# Patient Record
Sex: MALE | Race: WHITE | NOT HISPANIC OR LATINO | Employment: FULL TIME | URBAN - METROPOLITAN AREA
[De-identification: names, ages, dates, MRNs, and addresses within clinical notes are randomized per-mention and may not be internally consistent; named-entity substitution may affect disease eponyms.]

---

## 2023-07-11 ENCOUNTER — OFFICE VISIT (OUTPATIENT)
Dept: PRIMARY CARE | Facility: CLINIC | Age: 33
End: 2023-07-11

## 2023-07-11 VITALS
DIASTOLIC BLOOD PRESSURE: 58 MMHG | SYSTOLIC BLOOD PRESSURE: 102 MMHG | HEIGHT: 74 IN | RESPIRATION RATE: 16 BRPM | WEIGHT: 165 LBS | OXYGEN SATURATION: 100 % | BODY MASS INDEX: 21.17 KG/M2 | HEART RATE: 98 BPM

## 2023-07-11 DIAGNOSIS — R06.02 SOB (SHORTNESS OF BREATH): Primary | ICD-10-CM

## 2023-07-11 DIAGNOSIS — R06.02 SOB (SHORTNESS OF BREATH): ICD-10-CM

## 2023-07-11 PROBLEM — R79.89 ELEVATED SERUM CREATININE: Status: ACTIVE | Noted: 2023-07-11

## 2023-07-11 PROBLEM — R79.89 LOW VITAMIN D LEVEL: Status: ACTIVE | Noted: 2023-07-11

## 2023-07-11 PROBLEM — M54.2 TRIGGER POINT WITH NECK PAIN: Status: ACTIVE | Noted: 2023-07-11

## 2023-07-11 PROBLEM — S43.439A LABRAL TEAR OF SHOULDER: Status: ACTIVE | Noted: 2023-07-11

## 2023-07-11 PROBLEM — M54.42 ACUTE BILATERAL LOW BACK PAIN WITH LEFT-SIDED SCIATICA: Status: ACTIVE | Noted: 2023-07-11

## 2023-07-11 PROBLEM — R42 DIZZINESS: Status: ACTIVE | Noted: 2023-07-11

## 2023-07-11 PROBLEM — R51.9 HEADACHE: Status: ACTIVE | Noted: 2023-07-11

## 2023-07-11 PROBLEM — R47.01 EXPRESSIVE APHASIA: Status: ACTIVE | Noted: 2023-07-11

## 2023-07-11 PROBLEM — R07.0 THROAT PAIN: Status: ACTIVE | Noted: 2023-07-11

## 2023-07-11 PROBLEM — R22.1 NECK SWELLING: Status: ACTIVE | Noted: 2023-07-11

## 2023-07-11 PROBLEM — M79.18 MYOFASCIAL PAIN SYNDROME: Status: ACTIVE | Noted: 2023-07-11

## 2023-07-11 PROBLEM — M54.9 BACK PAIN: Status: ACTIVE | Noted: 2023-07-11

## 2023-07-11 PROCEDURE — 1036F TOBACCO NON-USER: CPT | Performed by: STUDENT IN AN ORGANIZED HEALTH CARE EDUCATION/TRAINING PROGRAM

## 2023-07-11 PROCEDURE — 99213 OFFICE O/P EST LOW 20 MIN: CPT | Performed by: STUDENT IN AN ORGANIZED HEALTH CARE EDUCATION/TRAINING PROGRAM

## 2023-07-11 RX ORDER — CETIRIZINE HYDROCHLORIDE 10 MG/1
10 TABLET ORAL DAILY
Qty: 30 TABLET | Refills: 2 | Status: SHIPPED | OUTPATIENT
Start: 2023-07-11 | End: 2023-07-12

## 2023-07-11 RX ORDER — PNV NO.95/FERROUS FUM/FOLIC AC 28MG-0.8MG
1 TABLET ORAL DAILY
COMMUNITY
End: 2023-07-11 | Stop reason: ALTCHOICE

## 2023-07-11 RX ORDER — VITAMIN B COMPLEX
1 CAPSULE ORAL DAILY
COMMUNITY
End: 2023-07-11 | Stop reason: ALTCHOICE

## 2023-07-11 RX ORDER — ALBUTEROL SULFATE 90 UG/1
2 AEROSOL, METERED RESPIRATORY (INHALATION) EVERY 4 HOURS PRN
Qty: 8.5 G | Refills: 5 | Status: SHIPPED | OUTPATIENT
Start: 2023-07-11 | End: 2023-09-28 | Stop reason: ALTCHOICE

## 2023-07-11 RX ORDER — IBUPROFEN 100 MG/5ML
1 SUSPENSION, ORAL (FINAL DOSE FORM) ORAL DAILY
COMMUNITY

## 2023-07-11 RX ORDER — BUTYROSPERMUM PARKII(SHEA BUTTER), SIMMONDSIA CHINENSIS (JOJOBA) SEED OIL, ALOE BARBADENSIS LEAF EXTRACT .01; 1; 3.5 G/100G; G/100G; G/100G
250 LIQUID TOPICAL 2 TIMES DAILY
COMMUNITY
End: 2023-09-28 | Stop reason: ALTCHOICE

## 2023-07-11 RX ORDER — MAGNESIUM GLUCONATE 27 MG(500)
27 TABLET ORAL 2 TIMES DAILY
COMMUNITY

## 2023-07-11 RX ORDER — OMEGA-3-ACID ETHYL ESTERS 1 G/1
1 CAPSULE, LIQUID FILLED ORAL 2 TIMES DAILY
COMMUNITY

## 2023-07-11 RX ORDER — CALCIUM CARB/VITAMIN D3/VIT K1 500-500-40
1 TABLET,CHEWABLE ORAL DAILY
COMMUNITY

## 2023-07-11 ASSESSMENT — PATIENT HEALTH QUESTIONNAIRE - PHQ9
1. LITTLE INTEREST OR PLEASURE IN DOING THINGS: NOT AT ALL
2. FEELING DOWN, DEPRESSED OR HOPELESS: SEVERAL DAYS
SUM OF ALL RESPONSES TO PHQ9 QUESTIONS 1 AND 2: 1
10. IF YOU CHECKED OFF ANY PROBLEMS, HOW DIFFICULT HAVE THESE PROBLEMS MADE IT FOR YOU TO DO YOUR WORK, TAKE CARE OF THINGS AT HOME, OR GET ALONG WITH OTHER PEOPLE: SOMEWHAT DIFFICULT

## 2023-07-11 NOTE — PROGRESS NOTES
Subjective   Patient ID: Juan Pablo Yanez is a 33 y.o. male who presents for Shortness of Breath (Pt is complaining of shortness of breath with some light chest pain. It started about 2 weeks ago. Pt was sick around 6/14. Around the 6/28 is when the shortness of breath started. He was seen at the  and nothing was seen on the x-ray.).    6/14 illness with fever, headache and body aches lasted about 5 days  6/26-6/29 felt lymphadenopathy with tenderness  Worsened with the smog and horrible air quality   2 weeks of SOB and chest tightness, yawning a lot   Symptoms of sob is intermittent  Worse when he talks  Was evaluated by cxr at the urgent care, which was negative   Feels sob the worse right when he is about to get up in the AM   Cough dry cough started over the past couple of days  Nothing makes it tana or worse   Feels that his ribs intermittently have been tight   Hx: of adverse reaction to steroid (Suicidal ideation)    No PMHx of childhood Asthma         Review of Systems    Objective   Physical Exam  Constitutional:       Appearance: Normal appearance.   Cardiovascular:      Rate and Rhythm: Normal rate and regular rhythm.      Heart sounds: No murmur heard.  Pulmonary:      Effort: Pulmonary effort is normal. No respiratory distress.      Breath sounds: Normal breath sounds. No wheezing.   Musculoskeletal:      Cervical back: Neck supple.      Left lower leg: No edema.   Neurological:      Mental Status: He is alert.         Assessment/Plan   Diagnoses and all orders for this visit:  SOB (shortness of breath)  Comments:  Likely secondary to viral illness that has shortness of breath residuals  If shortness of breath continues please return to care in 1 week for possible imaging  Orders:  -     albuterol (ProAir HFA) 90 mcg/actuation inhaler; Inhale 2 puffs every 4 hours if needed for wheezing or shortness of breath.

## 2023-07-12 RX ORDER — CETIRIZINE HYDROCHLORIDE 10 MG/1
TABLET ORAL
Qty: 90 TABLET | Refills: 1 | Status: SHIPPED | OUTPATIENT
Start: 2023-07-12 | End: 2023-09-28 | Stop reason: ALTCHOICE

## 2023-09-28 ENCOUNTER — OFFICE VISIT (OUTPATIENT)
Dept: PRIMARY CARE | Facility: CLINIC | Age: 33
End: 2023-09-28

## 2023-09-28 VITALS
RESPIRATION RATE: 18 BRPM | DIASTOLIC BLOOD PRESSURE: 62 MMHG | BODY MASS INDEX: 21.12 KG/M2 | SYSTOLIC BLOOD PRESSURE: 100 MMHG | WEIGHT: 164.6 LBS | HEART RATE: 73 BPM | HEIGHT: 74 IN | OXYGEN SATURATION: 99 %

## 2023-09-28 DIAGNOSIS — Z13.6 SCREENING FOR CARDIOVASCULAR CONDITION: ICD-10-CM

## 2023-09-28 DIAGNOSIS — K21.9 GASTROESOPHAGEAL REFLUX DISEASE WITHOUT ESOPHAGITIS: Primary | ICD-10-CM

## 2023-09-28 PROBLEM — R51.9 HEADACHE: Status: RESOLVED | Noted: 2023-07-11 | Resolved: 2023-09-28

## 2023-09-28 PROBLEM — R07.0 THROAT PAIN: Status: RESOLVED | Noted: 2023-07-11 | Resolved: 2023-09-28

## 2023-09-28 PROBLEM — M25.312 INSTABILITY OF LEFT SHOULDER JOINT: Status: RESOLVED | Noted: 2018-03-12 | Resolved: 2023-09-28

## 2023-09-28 PROBLEM — M54.42 ACUTE BILATERAL LOW BACK PAIN WITH LEFT-SIDED SCIATICA: Status: RESOLVED | Noted: 2023-07-11 | Resolved: 2023-09-28

## 2023-09-28 PROBLEM — R07.89 CHEST DISCOMFORT: Status: RESOLVED | Noted: 2023-09-28 | Resolved: 2023-09-28

## 2023-09-28 PROBLEM — M24.112 DEGENERATIVE TEAR OF GLENOID LABRUM OF LEFT SHOULDER: Status: RESOLVED | Noted: 2018-03-12 | Resolved: 2023-09-28

## 2023-09-28 PROBLEM — R42 DIZZINESS: Status: RESOLVED | Noted: 2023-07-11 | Resolved: 2023-09-28

## 2023-09-28 PROBLEM — R47.01 EXPRESSIVE APHASIA: Status: RESOLVED | Noted: 2023-07-11 | Resolved: 2023-09-28

## 2023-09-28 PROBLEM — R59.0 ENLARGED LYMPH NODE IN NECK: Status: RESOLVED | Noted: 2023-06-27 | Resolved: 2023-09-28

## 2023-09-28 PROBLEM — E83.52 HYPERCALCEMIA: Status: RESOLVED | Noted: 2023-09-28 | Resolved: 2023-09-28

## 2023-09-28 PROBLEM — R22.1 NECK SWELLING: Status: RESOLVED | Noted: 2023-07-11 | Resolved: 2023-09-28

## 2023-09-28 PROBLEM — R07.9 CHEST PAIN: Status: RESOLVED | Noted: 2023-07-10 | Resolved: 2023-09-28

## 2023-09-28 PROBLEM — M25.512 PAIN IN LEFT SHOULDER: Status: RESOLVED | Noted: 2018-03-12 | Resolved: 2023-09-28

## 2023-09-28 PROBLEM — G89.29 OTHER CHRONIC PAIN: Status: RESOLVED | Noted: 2023-09-28 | Resolved: 2023-09-28

## 2023-09-28 PROBLEM — M54.2 TRIGGER POINT WITH NECK PAIN: Status: RESOLVED | Noted: 2023-07-11 | Resolved: 2023-09-28

## 2023-09-28 PROBLEM — R06.02 SHORTNESS OF BREATH: Status: RESOLVED | Noted: 2023-06-28 | Resolved: 2023-09-28

## 2023-09-28 PROBLEM — R79.89 ELEVATED SERUM CREATININE: Status: RESOLVED | Noted: 2023-07-11 | Resolved: 2023-09-28

## 2023-09-28 PROCEDURE — 1036F TOBACCO NON-USER: CPT | Performed by: NURSE PRACTITIONER

## 2023-09-28 PROCEDURE — 99214 OFFICE O/P EST MOD 30 MIN: CPT | Performed by: NURSE PRACTITIONER

## 2023-09-28 RX ORDER — FAMOTIDINE 20 MG/1
20 TABLET, FILM COATED ORAL 2 TIMES DAILY
Qty: 60 TABLET | Refills: 5 | Status: SHIPPED | OUTPATIENT
Start: 2023-09-28 | End: 2023-09-28 | Stop reason: SDUPTHER

## 2023-09-28 RX ORDER — FAMOTIDINE 20 MG/1
20 TABLET, FILM COATED ORAL 2 TIMES DAILY
Qty: 60 TABLET | Refills: 5 | Status: SHIPPED | OUTPATIENT
Start: 2023-09-28 | End: 2023-10-16

## 2023-09-28 NOTE — PROGRESS NOTES
"Subjective   Patient ID: Juan Pablo Yanez is a 33 y.o. male who presents for Follow-up (Patient in today with concerns of consistent SOB and chest pain that worsens when he eats or is active. States that this has been going on for the last few months, saw  in July for the same concerns. ).    Had shortness of breath with mild chest pain.  Was seen in  in July and negative chest xray.  Followed up with my colleague.  Xray reviewed and were normal  Was given inhaler.  Did not notice a difference when using  Went to ER about 10 days later with chest pain and shortness of breath - EKG, labs were normal.  Diagnosed with panic attack    In August noticed symptoms will be worse after he eats.  Complains of reflux as well.  Notices is worse after eating heavier meals and foods. Has noticed increased burping  He went to chiropractor and had visceral manipulation one month ago - had temporary relief   Denies nausea,vomiting, or diarrhea          Review of Systems   All other systems reviewed and are negative.      Objective   /62   Pulse 73   Resp 18   Ht 1.88 m (6' 2\")   Wt 74.7 kg (164 lb 9.6 oz)   SpO2 99%   BMI 21.13 kg/m²     Physical Exam  Vitals and nursing note reviewed.   Constitutional:       General: He is not in acute distress.     Appearance: Normal appearance.   HENT:      Head: Normocephalic and atraumatic.      Right Ear: External ear normal.      Left Ear: External ear normal.      Mouth/Throat:      Mouth: Mucous membranes are moist.      Pharynx: Oropharynx is clear.   Eyes:      Extraocular Movements: Extraocular movements intact.      Conjunctiva/sclera: Conjunctivae normal.      Pupils: Pupils are equal, round, and reactive to light.   Neck:      Vascular: No carotid bruit.   Cardiovascular:      Rate and Rhythm: Normal rate and regular rhythm.      Pulses: Normal pulses.      Heart sounds: Normal heart sounds.   Pulmonary:      Effort: Pulmonary effort is normal.      Breath sounds: " Normal breath sounds.   Abdominal:      General: Abdomen is flat. Bowel sounds are normal. There is no distension.      Palpations: Abdomen is soft.      Tenderness: There is no abdominal tenderness. There is no guarding or rebound.   Lymphadenopathy:      Cervical: No cervical adenopathy.   Skin:     General: Skin is warm and dry.   Neurological:      General: No focal deficit present.      Mental Status: He is alert and oriented to person, place, and time. Mental status is at baseline.   Psychiatric:         Mood and Affect: Mood normal.         Behavior: Behavior normal.         Thought Content: Thought content normal.         Judgment: Judgment normal.         Assessment/Plan   Problem List Items Addressed This Visit             ICD-10-CM    Gastroesophageal reflux disease without esophagitis - Primary K21.9     - famotidine twice a day  -  Do not eat 1-2 hours before bed   -  decrease alcohol consumption  -  decrease caffeine intake  - eat small frequent meals  - cbc,cmp, lipid, tsh, vitamin d, vitamin b  - reviewed hospital records - normal cardiac workup         Relevant Medications    famotidine (Pepcid) 20 mg tablet    Other Relevant Orders    CBC    Comprehensive Metabolic Panel    Vitamin B12    Vitamin D 25-Hydroxy,Total (for eval of Vitamin D levels)    TSH with reflex to Free T4 if abnormal    Referral to Gastroenterology     Other Visit Diagnoses         Codes    Screening for cardiovascular condition     Z13.6    Relevant Orders    Lipid Panel

## 2023-09-28 NOTE — PATIENT INSTRUCTIONS
I want to start you on a medication to help with the reflux - you will take it 2 times a day - first thing in the morning and then prior to bed  I placed a referral to GI - to discuss a possible scope  I ordered lab work as well

## 2023-09-28 NOTE — ASSESSMENT & PLAN NOTE
- famotidine twice a day  -  Do not eat 1-2 hours before bed   -  decrease alcohol consumption  -  decrease caffeine intake  - eat small frequent meals  - cbc,cmp, lipid, tsh, vitamin d, vitamin b  - reviewed hospital records - normal cardiac workup

## 2023-10-12 DIAGNOSIS — K21.9 GASTROESOPHAGEAL REFLUX DISEASE WITHOUT ESOPHAGITIS: ICD-10-CM

## 2023-10-16 RX ORDER — FAMOTIDINE 20 MG/1
20 TABLET, FILM COATED ORAL 2 TIMES DAILY
Qty: 180 TABLET | Refills: 2 | Status: SHIPPED | OUTPATIENT
Start: 2023-10-16